# Patient Record
Sex: FEMALE | Race: WHITE | HISPANIC OR LATINO | Employment: FULL TIME | URBAN - METROPOLITAN AREA
[De-identification: names, ages, dates, MRNs, and addresses within clinical notes are randomized per-mention and may not be internally consistent; named-entity substitution may affect disease eponyms.]

---

## 2017-02-21 ENCOUNTER — TRANSFERRED RECORDS (OUTPATIENT)
Dept: HEALTH INFORMATION MANAGEMENT | Facility: CLINIC | Age: 37
End: 2017-02-21

## 2017-02-24 ENCOUNTER — OFFICE VISIT (OUTPATIENT)
Dept: ORTHOPEDICS | Facility: CLINIC | Age: 37
End: 2017-02-24
Payer: COMMERCIAL

## 2017-02-24 VITALS
SYSTOLIC BLOOD PRESSURE: 112 MMHG | HEIGHT: 70 IN | WEIGHT: 203 LBS | DIASTOLIC BLOOD PRESSURE: 70 MMHG | BODY MASS INDEX: 29.06 KG/M2

## 2017-02-24 DIAGNOSIS — M25.562 ACUTE PAIN OF LEFT KNEE: Primary | ICD-10-CM

## 2017-02-24 PROCEDURE — 99213 OFFICE O/P EST LOW 20 MIN: CPT | Performed by: FAMILY MEDICINE

## 2017-02-24 NOTE — MR AVS SNAPSHOT
"              After Visit Summary   2017    Adore Roland    MRN: [id number]           Patient Information     Date Of Birth          1980        Visit Information        Provider Department      2017 8:00 AM Osorio Bain MD [facility]        Today's Diagnoses     Acute pain of left knee    -  1       Follow-ups after your visit        Follow-up notes from your care team     Return if symptoms worsen or fail to improve.      Who to contact     If you have questions or need follow up information about today's clinic visit or your schedule please contact [facility] directly at [phone].  Normal or non-critical lab and imaging results will be communicated to you by MyChart, letter or phone within 4 business days after the clinic has received the results. If you do not hear from us within 7 days, please contact the clinic through [employer] or phone. If you have a critical or abnormal lab result, we will notify you by phone as soon as possible.  Submit refill requests through [employer] or call your pharmacy and they will forward the refill request to us. Please allow 3 business days for your refill to be completed.          Additional Information About Your Visit        MyChart Information     [employer] lets you send messages to your doctor, view your test results, renew your prescriptions, schedule appointments and more. To sign up, go to [email or web address] . Click on \"Log in\" on the left side of the screen, which will take you to the Welcome page. Then click on \"Sign up Now\" on the right side of the page.     You will be asked to enter the access code listed below, as well as some personal information. Please follow the directions to create your username and password.     Your access code is: [email or web address]  Expires: 2017  9:21 AM     Your access code will  in 90 days. If you need help " "or a new code, please call your [place] clinic or [phone].        Care EveryWhere ID     This is your Care EveryWhere ID. This could be used by other organizations to access your [place] medical records  [id number]        Your Vitals Were     Height BMI (Body Mass Index)                5' 9.5\" (1.765 m) 29.55 kg/m2           Blood Pressure from Last 3 Encounters:   02/24/17 112/70   01/14/16 108/76   10/09/14 123/74    Weight from Last 3 Encounters:   02/24/17 203 lb (92.1 kg)   01/14/16 203 lb (92.1 kg)   10/09/14 185 lb (83.9 kg)              Today, you had the following     No orders found for display       Primary Care Provider    Md Other Clinic                Thank you!     Thank you for choosing [facility]  for your care. Our goal is always to provide you with excellent care. Hearing back from our patients is one way we can continue to improve our services. Please take a few minutes to complete the written survey that you may receive in the mail after your visit with us. Thank you!             Your Updated Medication List - Protect others around you: Learn how to safely use, store and throw away your medicines at www.disposemymeds.org.      Notice  As of 2/24/2017  9:21 AM    You have not been prescribed any medications.      "

## 2017-02-24 NOTE — NURSING NOTE
"Chief Complaint   Patient presents with     Musculoskeletal Problem     L medial knee pain       Initial /70  Ht 5' 9.5\" (1.765 m)  Wt 203 lb (92.1 kg)  BMI 29.55 kg/m2 Estimated body mass index is 29.55 kg/(m^2) as calculated from the following:    Height as of this encounter: 5' 9.5\" (1.765 m).    Weight as of this encounter: 203 lb (92.1 kg).  Medication Reconciliation: complete     Domenic Owen, ATC      "

## 2017-02-24 NOTE — PROGRESS NOTES
"[facility]   Clinic Visit s Feb 24, 2017    PCP: Other Clinic, Md      Adore is a 36 year old female who is seen as self referral for   Chief Complaint   Patient presents with     Musculoskeletal Problem     L medial knee pain     Injury: pain started after playing volleyball on 2/15/17, does recall having an episode of landing on her knee and forcing her knees into hyperflexion but didn't have immediate pain    Location of Pain: left medial knee pain  Duration of Pain: 9 day(s)  Rating of Pain at worst: 10/10  Rating of Pain Currently: 2/10  Pain is worse with: twisting on left knee, walking  Pain is better with: elevating  Treatment so far consists of: rest/activity avoidance, elevation, ice and knee brace  Associated symptoms: swelling  Prior History of related problems: none    Mild pain with twisting, pivoting pain.    No locking or other mechanical symptoms.    Social History: works at [employer]       Family History   Problem Relation Age of Onset     DIABETES Maternal Grandfather      DIABETES Paternal Aunt      Other - See Comments Maternal Grandfather      heart troubles       Social History     Social History     Marital status: Single     Spouse name: N/A     Number of children: N/A     Years of education: N/A     Social History Main Topics     Smoking status: Never Smoker     Smokeless tobacco: Never Used     Alcohol use 0.0 oz/week     0 Standard drinks or equivalent per week       PMH, PSHX, FMHX, SOCHX all reviewed and are unremarkable or noncontributory to today's visit.  See intake form for details.      Review of Systems   Musculoskeletal: Positive for joint pain.   All other systems reviewed and are negative.        Physical Exam   Musculoskeletal:        Left knee: Medial joint line tenderness noted.     /70  Ht 5' 9.5\" (1.765 m)  Wt 203 lb (92.1 kg)  BMI 29.55 kg/m2  Constitutional:well-developed, well-nourished, and in no distress.   Cardiovascular: Intact " distal pulses.    Neurological: alert. Gait Normal:   Gait, station, stance, and balance appear normal for age  Skin: Skin is warm and dry.   Psychiatric: Mood and affect normal.   Respiratory: unlabored, speaks in full sentences  Lymph: no LAD, no lymphangitis      Left Knee Exam   Swelling: Mild  Effusion: Yes    Tenderness   The patient is experiencing tenderness in the medial joint line.    Range of Motion   Extension: Normal  Flexion:     Normal    Tests   McMurrays:  Medial - Positive      Lateral - Negative  Lachman:  Anterior - Negative    Posterior - Negative  Drawer:       Anterior - Negative     Posterior - Negative  Varus:  Negative  Valgus: Negative  Pivot Shift: Negative  Patellar Apprehension: No    Comments:  Thessaly test mild positive medially     Right Knee Exam   Swelling: None  Effusion: No    Tenderness   None    Range of Motion   Extension: Normal  Flexion:     Normal    Tests   McMurrays:  Medial - Negative      Lateral - Negative  Lachman:  Anterior - Negative    Posterior - Negative  Drawer:       Anterior - Negative    Posterior - Negative  Varus:  Negative  Valgus: Negative  Pivot Shift: Negative  Patellar Apprehension: No      external images and MRI reviewed, agree with below, inconclusive for mensicus tear:  [facility] IMAGING  MRI LEFT KNEE  2/21/2017 8:51 AM    INDICATION: Knee Pain.  TECHNIQUE: Routine.  COMPARISON: None.    FINDINGS:  MEDIAL COMPARTMENT: The medial meniscus is intact. Smooth articular cartilage on both sides of the compartment without significant thinning or edema.    LATERAL COMPARTMENT: On series 6, image 15 and series 8, image 12, there appears to be meniscal tissue flipped centrally toward the intracondylar notch worrisome for bucket-handle tear. However, the donor site for this tear is not definitively identified as the apical free margin of the lateral meniscal body appears intact. Recommend correlation with any mechanical type symptoms or  lateral-sided knee pain. Potentially, this could represent a bucket-handle tear from a previous discoid variant meniscus. There is smooth articular cartilage on both sides of the compartment without thinning or edema.    PATELLOFEMORAL COMPARTMENT: Cartilaginous tearing along the lateral retropatellar facet on series 3, image 26. Additional mild cartilage edema medial retropatellar facet. Smooth articular cartilage along the opposing surfaces of the femoral trochlear sulcus.    LIGAMENTS AND TENDONS: The cruciate ligaments are negative. The collateral ligaments are negative. The popliteus tendon is negative. The posterolateral corner structures are intact.    BONES AND SOFT TISSUES: No evidence for fracture. No marrow signal abnormality. Tiny effusion. Soft tissues of the popliteal fossa are negative.    CONCLUSION:  1.  There is abnormal soft tissue along the lateral margin of the intracondylar notch which appears contiguous with the lateral meniscus and is worrisome for a small bucket-handle flap although the donor site for this is not identified. Potentially, there could have been a discoid lateral meniscus.  2.  Cartilaginous tearing along the lateral retropatellar facet with additional mild cartilage edema medial retropatellar facet.  3.  No evidence for marrow signal abnormality or occult fracture.  4.  Tiny effusion.      ASSESSMENT/PLAN    ICD-10-CM    1. Acute pain of left knee M25.562      Reviewed MRI and exam findings in detail with the patient. Noted mildly positive mensicus injury findings on exam, without loss of ROM or clearly identifiable mechanical symptoms suggesting bucket handle tear. She is functioning fine, is finding that wearing knee brace is more uncomfortable without it.    Given inconclusive MRI findings and minimal symptoms, a trial of period of watchful waiting is acceptable. She may go about her regular activities, any may try returning to volleyball but she should wear brace while  playing vball ,instructed to monitor for worsening pain or any mechanical symptoms. She may call to discuss should these symptoms develop or return for recheck in 1 week. She was comfortable with this plan.

## 2021-04-15 ENCOUNTER — APPOINTMENT (OUTPATIENT)
Dept: CT IMAGING | Facility: CLINIC | Age: 41
End: 2021-04-15
Attending: EMERGENCY MEDICINE
Payer: COMMERCIAL

## 2021-04-15 ENCOUNTER — HOSPITAL ENCOUNTER (EMERGENCY)
Facility: CLINIC | Age: 41
Discharge: HOME | End: 2021-04-15
Attending: EMERGENCY MEDICINE | Admitting: EMERGENCY MEDICINE
Payer: COMMERCIAL

## 2021-04-15 VITALS
RESPIRATION RATE: 16 BRPM | BODY MASS INDEX: 32.58 KG/M2 | HEART RATE: 92 BPM | HEIGHT: 69 IN | DIASTOLIC BLOOD PRESSURE: 85 MMHG | TEMPERATURE: 98.3 F | OXYGEN SATURATION: 97 % | SYSTOLIC BLOOD PRESSURE: 126 MMHG | WEIGHT: 220 LBS

## 2021-04-15 DIAGNOSIS — K92.1 BLACK STOOL: ICD-10-CM

## 2021-04-15 DIAGNOSIS — R10.9 ABDOMINAL PAIN OF UNKNOWN CAUSE: ICD-10-CM

## 2021-04-15 LAB
ALBUMIN UR-MCNC: NEGATIVE MG/DL
ANION GAP SERPL CALCULATED.3IONS-SCNC: 5 MMOL/L (ref 3–14)
APPEARANCE UR: CLEAR
B-HCG FREE SERPL-ACNC: <5 IU/L
BASOPHILS # BLD AUTO: 0.1 10E9/L (ref 0–0.2)
BASOPHILS NFR BLD AUTO: 0.7 %
BILIRUB UR QL STRIP: NEGATIVE
BUN SERPL-MCNC: 11 MG/DL (ref 7–30)
CALCIUM SERPL-MCNC: 9.1 MG/DL (ref 8.5–10.1)
CHLORIDE SERPL-SCNC: 108 MMOL/L (ref 94–109)
CO2 SERPL-SCNC: 24 MMOL/L (ref 20–32)
COLOR UR AUTO: ABNORMAL
CREAT SERPL-MCNC: 1.16 MG/DL (ref 0.52–1.04)
DIFFERENTIAL METHOD BLD: NORMAL
EOSINOPHIL # BLD AUTO: 0.1 10E9/L (ref 0–0.7)
EOSINOPHIL NFR BLD AUTO: 1.2 %
ERYTHROCYTE [DISTWIDTH] IN BLOOD BY AUTOMATED COUNT: 11.9 % (ref 10–15)
GFR SERPL CREATININE-BSD FRML MDRD: 59 ML/MIN/{1.73_M2}
GLUCOSE SERPL-MCNC: 105 MG/DL (ref 70–99)
GLUCOSE UR STRIP-MCNC: NEGATIVE MG/DL
HCT VFR BLD AUTO: 39.7 % (ref 35–47)
HEMOCCULT STL QL: NEGATIVE
HGB BLD-MCNC: 13.1 G/DL (ref 11.7–15.7)
HGB UR QL STRIP: NEGATIVE
IMM GRANULOCYTES # BLD: 0.1 10E9/L (ref 0–0.4)
IMM GRANULOCYTES NFR BLD: 0.5 %
INTERPRETATION ECG - MUSE: NORMAL
KETONES UR STRIP-MCNC: NEGATIVE MG/DL
LEUKOCYTE ESTERASE UR QL STRIP: NEGATIVE
LYMPHOCYTES # BLD AUTO: 1.9 10E9/L (ref 0.8–5.3)
LYMPHOCYTES NFR BLD AUTO: 19.1 %
MCH RBC QN AUTO: 29.6 PG (ref 26.5–33)
MCHC RBC AUTO-ENTMCNC: 33 G/DL (ref 31.5–36.5)
MCV RBC AUTO: 90 FL (ref 78–100)
MONOCYTES # BLD AUTO: 0.7 10E9/L (ref 0–1.3)
MONOCYTES NFR BLD AUTO: 6.7 %
MUCOUS THREADS #/AREA URNS LPF: PRESENT /LPF
NEUTROPHILS # BLD AUTO: 7 10E9/L (ref 1.6–8.3)
NEUTROPHILS NFR BLD AUTO: 71.8 %
NITRATE UR QL: NEGATIVE
NRBC # BLD AUTO: 0 10*3/UL
NRBC BLD AUTO-RTO: 0 /100
PH UR STRIP: 7 PH (ref 5–7)
PLATELET # BLD AUTO: 281 10E9/L (ref 150–450)
POTASSIUM SERPL-SCNC: 3.3 MMOL/L (ref 3.4–5.3)
RBC # BLD AUTO: 4.43 10E12/L (ref 3.8–5.2)
RBC #/AREA URNS AUTO: <1 /HPF (ref 0–2)
SODIUM SERPL-SCNC: 137 MMOL/L (ref 133–144)
SOURCE: ABNORMAL
SP GR UR STRIP: 1.01 (ref 1–1.03)
SQUAMOUS #/AREA URNS AUTO: 11 /HPF (ref 0–1)
UROBILINOGEN UR STRIP-MCNC: NORMAL MG/DL (ref 0–2)
WBC # BLD AUTO: 9.7 10E9/L (ref 4–11)
WBC #/AREA URNS AUTO: 5 /HPF (ref 0–5)

## 2021-04-15 PROCEDURE — 96360 HYDRATION IV INFUSION INIT: CPT | Mod: 59

## 2021-04-15 PROCEDURE — 258N000003 HC RX IP 258 OP 636: Performed by: EMERGENCY MEDICINE

## 2021-04-15 PROCEDURE — 74177 CT ABD & PELVIS W/CONTRAST: CPT

## 2021-04-15 PROCEDURE — 93005 ELECTROCARDIOGRAM TRACING: CPT

## 2021-04-15 PROCEDURE — 81001 URINALYSIS AUTO W/SCOPE: CPT | Performed by: EMERGENCY MEDICINE

## 2021-04-15 PROCEDURE — 82272 OCCULT BLD FECES 1-3 TESTS: CPT | Performed by: EMERGENCY MEDICINE

## 2021-04-15 PROCEDURE — 80048 BASIC METABOLIC PNL TOTAL CA: CPT | Performed by: EMERGENCY MEDICINE

## 2021-04-15 PROCEDURE — 250N000011 HC RX IP 250 OP 636: Performed by: EMERGENCY MEDICINE

## 2021-04-15 PROCEDURE — 96361 HYDRATE IV INFUSION ADD-ON: CPT

## 2021-04-15 PROCEDURE — 250N000009 HC RX 250: Performed by: EMERGENCY MEDICINE

## 2021-04-15 PROCEDURE — 99285 EMERGENCY DEPT VISIT HI MDM: CPT | Mod: 25

## 2021-04-15 PROCEDURE — 85025 COMPLETE CBC W/AUTO DIFF WBC: CPT | Performed by: EMERGENCY MEDICINE

## 2021-04-15 PROCEDURE — 84702 CHORIONIC GONADOTROPIN TEST: CPT

## 2021-04-15 RX ORDER — IOPAMIDOL 755 MG/ML
500 INJECTION, SOLUTION INTRAVASCULAR ONCE
Status: COMPLETED | OUTPATIENT
Start: 2021-04-15 | End: 2021-04-15

## 2021-04-15 RX ADMIN — SODIUM CHLORIDE 65 ML: 9 INJECTION, SOLUTION INTRAVENOUS at 08:30

## 2021-04-15 RX ADMIN — SODIUM CHLORIDE 1000 ML: 9 INJECTION, SOLUTION INTRAVENOUS at 08:14

## 2021-04-15 RX ADMIN — IOPAMIDOL 100 ML: 755 INJECTION, SOLUTION INTRAVENOUS at 08:30

## 2021-04-15 ASSESSMENT — ENCOUNTER SYMPTOMS
NAUSEA: 1
CHILLS: 0
VOMITING: 0
BLOOD IN STOOL: 1
DIARRHEA: 1
DIZZINESS: 0
FEVER: 0
ABDOMINAL PAIN: 1
SHORTNESS OF BREATH: 0

## 2021-04-15 ASSESSMENT — MIFFLIN-ST. JEOR: SCORE: 1742.29

## 2021-04-15 NOTE — ED TRIAGE NOTES
Pt had a colonoscopy at the end of last month. Presents today after one episode of black diarrhea. States that she took pepto 2 days ago. Denies meds today. A+Ox4, no acute signs of distress.

## 2021-04-15 NOTE — ED PROVIDER NOTES
History   Chief Complaint:  Abdominal Pain       The history is provided by the patient.      Adore Roland is a 40 year old female who presents with black diarrhea and associated abdominal pain. The patient reports that she recently underwent a colonoscopy about 2 weeks ago on 03/31 for symptoms of chronic LUQ abdominal pain and bright red blood in stool. The procedure identified two internal hemorrhoids, and also removed one large polyp. The patient reports that her abdominal pain became more frequent and intense after the colonoscopy and this morning was concerning to her due to the black diarrhea. She had a bowel movement at approximately 6:45 a.m. this morning that was loose and consisted of black stool. Her last normal bowel movement was on Tues 04/13 and was normal, and also notes that she took Pepto Bismol on this day. Here, the patient endorses some ongoing but dull abdominal pain. She has felt nauseated but denies any vomiting. She further denies any chest pain, shortness of breath, dizziness, fevers, or chills. No chance of pregnancy. Of note, the patient was reportedly treated for UTI Tuesday 04/13 at [facility] urgent care in [place], and was started on antibiotics. At that time she had symptoms of cloudy urine, frequency, and abdominal cramping, but no dysuria. She was given antibiotics which she had started, but has not yet taken her dose today.    03/31/2021 Colonoscopy Findings:  The perianal and digital rectal examinations were normal. A 15 mm polyp was found in the sigmoid colon. The polyp was pedunculated. The polyp was removed with a hot snare. Resection and retrieval were complete. To prevent bleeding after the polypectomy, one hemostatic clip was successfully placed (MR conditional). There was no bleeding at the end of the procedure. Non-bleeding internal hemorrhoids were found during retroflexion. The hemorrhoids were mild. The terminal ileum appeared normal.    04/13/2021  "Urinalysis:  Hazy clarity (A), Specific gravity 1.030 (A), Small bilirubin (A), Trace leukocyte esterase (A), Moderate bacteria (A), Moderate squamous epithelial cells (A), Mucous present (A), o/w WNL      Review of Systems   Constitutional: Negative for chills and fever.   Respiratory: Negative for shortness of breath.    Cardiovascular: Negative for chest pain.   Gastrointestinal: Positive for abdominal pain, blood in stool (black), diarrhea and nausea. Negative for vomiting.   Neurological: Negative for dizziness.   All other systems reviewed and are negative.    Allergies:  No Known Drug Allergies    Medications:  Levothyroxine  Bactrim    Past Medical History:    GERD  Thyroid nodule    Past Surgical History:    Colonoscopy with polypectomy  Bartholin gland cyst excision  Therapeutic     Family History:    Father - mitral valve prolapse  Mother - hypothyroidism    Social History:  The patient was not accompanied to the ED.  Marital status:     Physical Exam     Patient Vitals for the past 24 hrs:   BP Temp Temp src Pulse Resp SpO2 Height Weight   04/15/21 1045 126/85 -- -- 92 -- 97 % -- --   04/15/21 1030 131/84 -- -- 101 -- 97 % -- --   04/15/21 1015 120/87 -- -- 88 -- 97 % -- --   04/15/21 1000 132/77 -- -- 90 -- 98 % -- --   04/15/21 0945 136/84 -- -- 87 -- 97 % -- --   04/15/21 0930 (!) 130/90 -- -- 99 -- 97 % -- --   04/15/21 0915 (!) 148/99 -- -- 96 -- 97 % -- --   04/15/21 0900 (!) 141/94 -- -- 101 -- 97 % -- --   04/15/21 0856 -- -- -- -- -- -- 1.753 m (5' 9\") 99.8 kg (220 lb)   04/15/21 0845 (!) 133/90 -- -- 104 -- -- -- --   04/15/21 0815 121/86 -- -- 88 -- 96 % -- --   04/15/21 0738 133/85 98.3  F (36.8  C) Temporal 112 16 98 % -- --       Physical Exam  General: Adult female sitting upright  Eyes: PERRL, Conjunctive within normal limits. No scleral icterus.  ENT: Moist mucous membranes, oropharynx clear.   CV: Normal S1S2, no murmur, rub or gallop. Regular rate and rhythm  Resp: Clear " to auscultation bilaterally, no wheezes, rales or rhonchi. Normal respiratory effort.  GI: Abdomen is soft and nondistended. RLQ tenderness to palpation. Mild tenderness in the LUQ. No palpable masses. No rebound or guarding.  Rectal: Normal tone. Small amount of greenish stool in the rectal vault. No visible melena or blood. Nontender. No palpable masses.   MSK: No edema. Nontender. Normal active range of motion.  Skin: Warm and dry. No rashes or lesions or ecchymoses on visible skin.  Neuro: Alert and oriented. Responds appropriately to all questions and commands. No focal findings appreciated. Normal muscle tone.  Psych: Normal mood and affect. Pleasant.    Emergency Department Course     ECG:  Completed at 0746.  Read at 0746.   Sinus tachycardia   Rate 111 bpm. GA interval 188. QRS duration 88. QT/QTc 342/465. P-R-T axes 56 60 26.  Agree with computer interpretation.     Imaging:  CT Abdomen Pelvis w Contrast:  1.  No acute findings in the abdomen and pelvis.  2.  Linear metallic density in the sigmoid colon appears to be an ingested metallic foreign object. No surrounding inflammatory changes. No perforation.  Report per radiology.    Laboratory:  CBC: WBC 9.7, HGB 13.1,   BMP: Potassium 3.3 (L), Glucose 105 (H), Creatinine 1.16 (H), GFR estimate 59 (L), o/w WNL     ISTAT HCG quantitative pregnancy: <5.0    UA with microscopic: Squamous epithelial/HPF 11 (H), Mucous present (A), o/w WNL     Occult blood stool: negative    Emergency Department Course:    Reviewed:  I reviewed the patient's nursing notes, vitals, past medical history and care everywhere.     Assessments:  0743 I performed an exam of the patient in room 08 as documented above.  0921 Patient rechecked and updated. She is feeling anxious.  1020 I rechecked and updated the patient.    Interventions:  0814 NS 1L IV Bolus    Disposition:  The patient was discharged to home.     Impression & Plan     Medical Decision Making:  Adore Roland is  a 40 year old female who presents with abdominal pain and black appearing stool.  This was in setting of use of Pepto-Bismol yesterday, so may be false.  Negative stool occult is supportive of this.  She looks overall well and without a concerning etiology of abdominal pain.  Abdominal pain has been somewhat chronic in nature, but worse recently and she has never had any imaging, therefore a broad differential diagnosis was of course considered.  Localization of pain to the right lower quadrant may indicate appendicitis.  Complication due to recent colonoscopy seems less likely given duration of time that has elapsed since the procedure.  The workup in the ED is at this point negative.  No etiology for the patients pain is found at this point and my suspicion of an intraabdominal catastrophe or other worrisome etiology is very low. CT and lab work are reassuring.  The metallic density noted is likely the surgical clip after polypectomy.  And her overall well appearance and no signs of active bleeding or likely bleeding at all, I will not therefore admit her for serial exams and further workup.  Patient is hemodynamically stable in ED. Plan is home with abdominal pain recheck by primary care physician within 2 to 3 days or return to ED at anytime.  Return for fevers greater than 102, increasing pain, other new symptoms develop.  Abdominal pain handout given.  Questions were answered.     Diagnosis:    ICD-10-CM    1. Abdominal pain of unknown cause  R10.9    2. Black stool  K92.1        Discharge Medications:   None.      Scribe Disclosure:  Vera NEVES, am serving as a scribe at 7:43 AM on 4/15/2021 to document services personally performed by Salma Bales MD based on my observations and the provider's statements to me.     This note was completed in part using Dragon voice recognition software. Although reviewed after completion, some word and grammatical errors may occur.     Vera  Varinder  4/15/2021   [facility] [place]         Salma Bales MD  04/15/21 [id number]

## 2021-09-12 ENCOUNTER — HOSPITAL ENCOUNTER (EMERGENCY)
Facility: CLINIC | Age: 41
Discharge: HOME | End: 2021-09-12
Attending: EMERGENCY MEDICINE | Admitting: EMERGENCY MEDICINE
Payer: COMMERCIAL

## 2021-09-12 ENCOUNTER — APPOINTMENT (OUTPATIENT)
Dept: CT IMAGING | Facility: CLINIC | Age: 41
End: 2021-09-12
Attending: EMERGENCY MEDICINE
Payer: COMMERCIAL

## 2021-09-12 VITALS
RESPIRATION RATE: 16 BRPM | TEMPERATURE: 97.7 F | BODY MASS INDEX: 31.01 KG/M2 | OXYGEN SATURATION: 100 % | SYSTOLIC BLOOD PRESSURE: 117 MMHG | DIASTOLIC BLOOD PRESSURE: 79 MMHG | HEART RATE: 89 BPM | WEIGHT: 210 LBS

## 2021-09-12 DIAGNOSIS — R51.9 NONINTRACTABLE HEADACHE, UNSPECIFIED CHRONICITY PATTERN, UNSPECIFIED HEADACHE TYPE: ICD-10-CM

## 2021-09-12 LAB
HOLD SPECIMEN: NORMAL

## 2021-09-12 PROCEDURE — 96374 THER/PROPH/DIAG INJ IV PUSH: CPT

## 2021-09-12 PROCEDURE — 258N000003 HC RX IP 258 OP 636: Performed by: EMERGENCY MEDICINE

## 2021-09-12 PROCEDURE — 250N000011 HC RX IP 250 OP 636: Performed by: EMERGENCY MEDICINE

## 2021-09-12 PROCEDURE — 99284 EMERGENCY DEPT VISIT MOD MDM: CPT | Mod: 25

## 2021-09-12 PROCEDURE — 96375 TX/PRO/DX INJ NEW DRUG ADDON: CPT

## 2021-09-12 PROCEDURE — 70450 CT HEAD/BRAIN W/O DYE: CPT

## 2021-09-12 PROCEDURE — 96361 HYDRATE IV INFUSION ADD-ON: CPT

## 2021-09-12 RX ORDER — KETOROLAC TROMETHAMINE 15 MG/ML
15 INJECTION, SOLUTION INTRAMUSCULAR; INTRAVENOUS ONCE
Status: COMPLETED | OUTPATIENT
Start: 2021-09-12 | End: 2021-09-12

## 2021-09-12 RX ORDER — DIPHENHYDRAMINE HYDROCHLORIDE 50 MG/ML
25 INJECTION INTRAMUSCULAR; INTRAVENOUS ONCE
Status: COMPLETED | OUTPATIENT
Start: 2021-09-12 | End: 2021-09-12

## 2021-09-12 RX ORDER — METOCLOPRAMIDE HYDROCHLORIDE 5 MG/ML
10 INJECTION INTRAMUSCULAR; INTRAVENOUS ONCE
Status: COMPLETED | OUTPATIENT
Start: 2021-09-12 | End: 2021-09-12

## 2021-09-12 RX ADMIN — SODIUM CHLORIDE 1000 ML: 9 INJECTION, SOLUTION INTRAVENOUS at 14:01

## 2021-09-12 RX ADMIN — DIPHENHYDRAMINE HYDROCHLORIDE 25 MG: 50 INJECTION INTRAMUSCULAR; INTRAVENOUS at 14:01

## 2021-09-12 RX ADMIN — KETOROLAC TROMETHAMINE 15 MG: 15 INJECTION, SOLUTION INTRAMUSCULAR; INTRAVENOUS at 14:02

## 2021-09-12 RX ADMIN — METOCLOPRAMIDE HYDROCHLORIDE 10 MG: 5 INJECTION INTRAMUSCULAR; INTRAVENOUS at 14:01

## 2021-09-12 ASSESSMENT — ENCOUNTER SYMPTOMS
NECK PAIN: 1
DYSURIA: 0
NUMBNESS: 1
DIARRHEA: 0
HEADACHES: 1

## 2021-09-12 NOTE — ED PROVIDER NOTES
History   Chief Complaint:  Headache       The history is provided by the patient.      Adore Roland is a 40 year old female otherwise healthy who presents with a headache. The patient has been experiencing sharp, intermittent pain in her head and neck for the past week and a half. She also has noticed increased ringing in both ears, sensitivity on the back of her head, and some tingling in her right hand and right foot. She has not had any vision changes, diarrhea, or dysuria. Tylenol has not relieved her symptoms. She did not fall or sustain any trauma to her head before the onset of her symptoms. She is vaccinated against COVID.      Review of Systems   Constitutional: Negative for chills and fever.   Eyes: Negative for visual disturbance.   Respiratory: Negative for cough.    Gastrointestinal: Negative for diarrhea, nausea and vomiting.   Genitourinary: Negative for dysuria.   Musculoskeletal: Positive for neck pain.   Neurological: Positive for numbness (tingling) and headaches. Negative for weakness.   Psychiatric/Behavioral: Negative for confusion.   All other systems reviewed and are negative.      Allergies:  The patient has no known allergies.     Medications:  The patient is not currently taking any prescribed medications.    Past Medical History:    The patient denies any significant past medical history.     Social History:  Patient presents alone    Physical Exam     Patient Vitals for the past 24 hrs:   BP Temp Temp src Pulse Resp SpO2 Weight   09/12/21 1524 -- -- -- -- -- 100 % --   09/12/21 1523 -- -- -- -- -- 100 % --   09/12/21 1521 -- -- -- -- -- 100 % --   09/12/21 1444 -- -- -- -- -- 100 % --   09/12/21 1443 -- -- -- -- -- 100 % --   09/12/21 1442 -- -- -- -- -- 100 % --   09/12/21 1433 -- -- -- -- -- 100 % --   09/12/21 1432 -- -- -- -- -- 100 % --   09/12/21 1431 -- -- -- -- -- 100 % --   09/12/21 1407 -- -- -- -- -- 100 % --   09/12/21 1406 117/79 -- -- 89 -- 100 % --   09/12/21 1336 --  -- -- -- -- 99 % --   09/12/21 1334 -- -- -- -- -- 99 % --   09/12/21 1333 -- -- -- -- -- 100 % --   09/12/21 1225 126/88 97.7  F (36.5  C) Temporal 112 16 100 % 95.3 kg (210 lb)       Physical Exam  Constitutional: Well appearing.  HEENT: Atraumatic.  PERRL.  EOMI. TMs normal bilaterally.  Moist mucous membranes.  Neck: Soft.  Supple.    Cardiac: Regular rate and rhythm.  No murmur or rub.  Respiratory: Clear to auscultation bilaterally.  No respiratory distress.  No wheezing, rhonchi, or rales.  Abdomen: Soft and nontender. Nondistended.  Musculoskeletal: No edema.  Normal range of motion.  Neurologic: Alert and oriented x3.  Normal tone and bulk.  No facial drooping.  Normal speech.  Normal finger-to-nose.  5/5 strength in bilateral upper and lower extremities.  Sensation to light touch intact throughout.  Normal gait.  Skin: No rashes.  No edema.  Psych: Normal affect.  Normal behavior.      Emergency Department Course     Imaging:  CT Head w/o Contrast  No evidence of acute intracranial hemorrhage, mass, or  herniation.  As per radiology    Emergency Department Course:    Reviewed:  I reviewed nursing notes, vitals and past medical history    Assessments:  1333 I obtained history and examined the patient as noted above.     Interventions:  1401 Reglan 10 mg IV  1401 Benadryl 25 mg IV  1402 Toradol 15 mg IV    Disposition:  The patient was discharged to home.       Impression & Plan     Medical Decision Making:  Adore Roland is a 32-year-old woman who is afebrile and hemodynamically stable.  She is neurologically intact with no focal deficits.  She has no fever or meningismus and my concern for meningitis/encephalitis is exceedingly low.  We discussed the risks and benefits of a CT scan of the head and she would prefer to obtain a CT scan of the head at this time.  Thankfully, this revealed no acute intracranial abnormalities.  She had some improvement with the above intervention.  Etiology of her headaches  include benign etiologies including tension headache versus migraine versus other.  I discussed the results and plan for discharge home with close primary care follow-up and potential referral for physical therapy or headache physician and she is in agreement understanding.  I see no emergent or life-threatening condition today that require hospitalization or further emergent testing or treatment.  Her questions were answered and she was in no distress at time of discharge.    Diagnosis:    ICD-10-CM    1. Nonintractable headache, unspecified chronicity pattern, unspecified headache type  R51.9        Discharge Medications:  New Prescriptions    No medications on file       Scribe Disclosure:  I, Maryjane Betancourt, am serving as a scribe at 12:50 PM on 9/12/2021 to document services personally performed by Max Cordova MD based on my observations and the provider's statements to me.              Max Cordova MD  09/13/21 0952

## 2021-09-12 NOTE — ED TRIAGE NOTES
Pt here with c/o intermittent, generalized HA and neck pain x1 week. No visual changes, dizziness, n/v. No OTC meds today. ABC intact. A&Ox4.

## 2021-09-13 ASSESSMENT — ENCOUNTER SYMPTOMS
CONFUSION: 0
FEVER: 0
NAUSEA: 0
WEAKNESS: 0
VOMITING: 0
COUGH: 0
CHILLS: 0

## 2021-11-18 ENCOUNTER — HOSPITAL ENCOUNTER (EMERGENCY)
Facility: CLINIC | Age: 41
Discharge: HOME | End: 2021-11-18
Attending: EMERGENCY MEDICINE | Admitting: EMERGENCY MEDICINE
Payer: COMMERCIAL

## 2021-11-18 VITALS
OXYGEN SATURATION: 97 % | HEART RATE: 89 BPM | TEMPERATURE: 97.2 F | RESPIRATION RATE: 16 BRPM | SYSTOLIC BLOOD PRESSURE: 125 MMHG | DIASTOLIC BLOOD PRESSURE: 78 MMHG

## 2021-11-18 DIAGNOSIS — I49.3 SYMPTOMATIC PVCS: ICD-10-CM

## 2021-11-18 DIAGNOSIS — R00.2 PALPITATIONS: ICD-10-CM

## 2021-11-18 LAB
ANION GAP SERPL CALCULATED.3IONS-SCNC: 7 MMOL/L (ref 3–14)
ATRIAL RATE - MUSE: 92 BPM
BASOPHILS # BLD AUTO: 0.1 10E3/UL (ref 0–0.2)
BASOPHILS NFR BLD AUTO: 1 %
BUN SERPL-MCNC: 14 MG/DL (ref 7–30)
CALCIUM SERPL-MCNC: 8.8 MG/DL (ref 8.5–10.1)
CHLORIDE BLD-SCNC: 107 MMOL/L (ref 94–109)
CO2 SERPL-SCNC: 25 MMOL/L (ref 20–32)
CREAT SERPL-MCNC: 0.92 MG/DL (ref 0.52–1.04)
D DIMER PPP FEU-MCNC: 0.41 UG/ML FEU (ref 0–0.5)
DIASTOLIC BLOOD PRESSURE - MUSE: NORMAL MMHG
EOSINOPHIL # BLD AUTO: 0.2 10E3/UL (ref 0–0.7)
EOSINOPHIL NFR BLD AUTO: 2 %
ERYTHROCYTE [DISTWIDTH] IN BLOOD BY AUTOMATED COUNT: 12.3 % (ref 10–15)
GFR SERPL CREATININE-BSD FRML MDRD: 79 ML/MIN/1.73M2
GLUCOSE BLD-MCNC: 124 MG/DL (ref 70–99)
HCT VFR BLD AUTO: 38.2 % (ref 35–47)
HGB BLD-MCNC: 12.5 G/DL (ref 11.7–15.7)
HOLD SPECIMEN: NORMAL
IMM GRANULOCYTES # BLD: 0 10E3/UL
IMM GRANULOCYTES NFR BLD: 0 %
INTERPRETATION ECG - MUSE: NORMAL
LYMPHOCYTES # BLD AUTO: 2.6 10E3/UL (ref 0.8–5.3)
LYMPHOCYTES NFR BLD AUTO: 31 %
MCH RBC QN AUTO: 29.7 PG (ref 26.5–33)
MCHC RBC AUTO-ENTMCNC: 32.7 G/DL (ref 31.5–36.5)
MCV RBC AUTO: 91 FL (ref 78–100)
MONOCYTES # BLD AUTO: 0.8 10E3/UL (ref 0–1.3)
MONOCYTES NFR BLD AUTO: 10 %
NEUTROPHILS # BLD AUTO: 4.7 10E3/UL (ref 1.6–8.3)
NEUTROPHILS NFR BLD AUTO: 56 %
NRBC # BLD AUTO: 0 10E3/UL
NRBC BLD AUTO-RTO: 0 /100
P AXIS - MUSE: 62 DEGREES
PLATELET # BLD AUTO: 302 10E3/UL (ref 150–450)
POTASSIUM BLD-SCNC: 3.6 MMOL/L (ref 3.4–5.3)
PR INTERVAL - MUSE: 180 MS
QRS DURATION - MUSE: 94 MS
QT - MUSE: 366 MS
QTC - MUSE: 452 MS
R AXIS - MUSE: 54 DEGREES
RBC # BLD AUTO: 4.21 10E6/UL (ref 3.8–5.2)
SODIUM SERPL-SCNC: 139 MMOL/L (ref 133–144)
SYSTOLIC BLOOD PRESSURE - MUSE: NORMAL MMHG
T AXIS - MUSE: 45 DEGREES
TROPONIN I SERPL-MCNC: <0.015 UG/L (ref 0–0.04)
VENTRICULAR RATE- MUSE: 92 BPM
WBC # BLD AUTO: 8.3 10E3/UL (ref 4–11)

## 2021-11-18 PROCEDURE — 84484 ASSAY OF TROPONIN QUANT: CPT | Performed by: EMERGENCY MEDICINE

## 2021-11-18 PROCEDURE — 93005 ELECTROCARDIOGRAM TRACING: CPT

## 2021-11-18 PROCEDURE — 80048 BASIC METABOLIC PNL TOTAL CA: CPT | Performed by: EMERGENCY MEDICINE

## 2021-11-18 PROCEDURE — 85025 COMPLETE CBC W/AUTO DIFF WBC: CPT | Performed by: EMERGENCY MEDICINE

## 2021-11-18 PROCEDURE — 85379 FIBRIN DEGRADATION QUANT: CPT | Performed by: EMERGENCY MEDICINE

## 2021-11-18 PROCEDURE — 99284 EMERGENCY DEPT VISIT MOD MDM: CPT

## 2021-11-18 PROCEDURE — 36415 COLL VENOUS BLD VENIPUNCTURE: CPT | Performed by: EMERGENCY MEDICINE

## 2021-11-18 ASSESSMENT — ENCOUNTER SYMPTOMS
PALPITATIONS: 1
VOMITING: 0
DIARRHEA: 0
COUGH: 0
NAUSEA: 1

## 2021-11-18 NOTE — ED TRIAGE NOTES
Pt aox4, ABCs intact. Pt c/o 3 days of SOB, worsening yesterday. Pt states that her HR was 90s which is high for her. Pt also having chest pressure and pains when she takes a deep breath. Pt states that a couple of weeks ago she was having right calf pain that has slightly improved.

## 2021-11-18 NOTE — ED PROVIDER NOTES
History     Chief Complaint:  Palpitations     The history is provided by the patient.      Adore Roland is a 41 year old female with history of GERD who presents with chest pain and palpitations. Patient says that for the past few days she has had palpitations and tachycardia with rapid breathing and intermittent chest pressure. She states tachycardia for her is a HR in the 90s.  She describes her palpitations as lasting seconds.  An hour before coming to the ED, she says she experienced some nausea, but no vomiting. Denies diarrhea or cough.    Review of Systems   Respiratory: Negative for cough.    Cardiovascular: Positive for chest pain and palpitations.   Gastrointestinal: Positive for nausea. Negative for diarrhea and vomiting.   All other systems reviewed and are negative.    Allergies:  No Known Allergies    Medications:  Levothyroxine  Prisolec  Pepcid    Past Medical History:     GERD  Hashimoto's thyroiditis     Past Surgical History:    Lipoma excision    Family History:    Diabetes  Heart problems    Social History:  Patient accompanied by   PCP: No Ref-Primary, Physician    Physical Exam     Patient Vitals for the past 24 hrs:   BP Temp Temp src Pulse Resp SpO2   11/18/21 0510 125/78 -- -- 89 16 97 %   11/18/21 0325 131/85 97.2  F (36.2  C) Temporal 101 24 99 %     Physical Exam  Nursing note and vitals reviewed.  Constitutional: Cooperative. Resting comfortably in a chair.   HENT:   Mouth/Throat: Mucous membranes are normal.   Eyes: Pupils are equal, round, and reactive to light.   Cardiovascular: Normal rate, regular rhythm and normal heart sounds.  No murmur.  Pulmonary/Chest: Effort normal and breath sounds normal. No respiratory distress. No wheezes. No rales.   Abdominal: Soft. Normal appearance. There is no tenderness.  Neurological: Alert. Oriented x4  Skin: Skin is warm and dry.   Psychiatric: Normal mood and affect.     Emergency Department Course     ECG  ECG obtained at 0343,  ECG read at 0343  Normal sinus rhythm with sinus arrhythmia. Normal ECG.   No previous ECG compared.  Rate 92 bpm. FL interval 180 ms. QRS duration 94 ms. QT/QTc 366/452 ms. P-R-T axes 62 54 45.     Laboratory:  Labs Ordered and Resulted from Time of ED Arrival to Time of ED Departure   BASIC METABOLIC PANEL - Abnormal       Result Value    Sodium 139      Potassium 3.6      Chloride 107      Carbon Dioxide (CO2) 25      Anion Gap 7      Urea Nitrogen 14      Creatinine 0.92      Calcium 8.8      Glucose 124 (*)     GFR Estimate 79     TROPONIN I - Normal    Troponin I <0.015     D DIMER QUANTITATIVE - Normal    D-Dimer Quantitative 0.41     CBC WITH PLATELETS AND DIFFERENTIAL    WBC Count 8.3      RBC Count 4.21      Hemoglobin 12.5      Hematocrit 38.2      MCV 91      MCH 29.7      MCHC 32.7      RDW 12.3      Platelet Count 302      % Neutrophils 56      % Lymphocytes 31      % Monocytes 10      % Eosinophils 2      % Basophils 1      % Immature Granulocytes 0      NRBCs per 100 WBC 0      Absolute Neutrophils 4.7      Absolute Lymphocytes 2.6      Absolute Monocytes 0.8      Absolute Eosinophils 0.2      Absolute Basophils 0.1      Absolute Immature Granulocytes 0.0      Absolute NRBCs 0.0        Reviewed:  I reviewed nursing notes, vitals, past medical history and Care Everywhere    Assessments/Consults:  ED Course as of 11/18/21 0516   Thu Nov 18, 2021   0508 Obtained history and examined the patient as noted above     Disposition:  The patient was discharged to home.     Impression & Plan     Medical Decision Making:  Adore Roland is a 41 year old female who presents for evaluation of palpitations.  Initial ECG shows sinus rhythm.  A broad differential diagnosis was considered including SVT, Atrial fibrillation, ventricular arrhythmia, thyroid disease, acute electrolyte abnormality,  drugs/medications, caffeine intake or other stimulants, medication side effect, anemia, heart disease, PE, etc.  The workup  and exam here in ED would indicate that supportive outpatient management is indicated. I suspect symptomatic PVC's based on her description of symptoms.  I doubt PE as patient has a negative dimer and is not hypoxic.  Doubt acute coronary syndrome given symptoms and exam.      Diagnosis:    ICD-10-CM    1. Palpitations  R00.2    2. Probable Symptomatic PVCs  I49.3        Scribe Disclosure:  I, Roger Mcguire, am serving as a scribe at 5:07 AM on 11/18/2021 to document services personally performed by Nate Altamirano MD based on my observations and the provider's statements to me.        Nate Altamirano MD  11/18/21 5217

## 2022-01-23 ENCOUNTER — HEALTH MAINTENANCE LETTER (OUTPATIENT)
Age: 42
End: 2022-01-23

## 2022-04-09 ENCOUNTER — HOSPITAL ENCOUNTER (EMERGENCY)
Facility: CLINIC | Age: 42
Discharge: HOME | End: 2022-04-10
Attending: EMERGENCY MEDICINE | Admitting: EMERGENCY MEDICINE
Payer: COMMERCIAL

## 2022-04-09 ENCOUNTER — APPOINTMENT (OUTPATIENT)
Dept: GENERAL RADIOLOGY | Facility: CLINIC | Age: 42
End: 2022-04-09
Attending: EMERGENCY MEDICINE
Payer: COMMERCIAL

## 2022-04-09 DIAGNOSIS — R07.9 CHEST PAIN, UNSPECIFIED TYPE: ICD-10-CM

## 2022-04-09 LAB
ANION GAP SERPL CALCULATED.3IONS-SCNC: 3 MMOL/L (ref 3–14)
BASOPHILS # BLD AUTO: 0.1 10E3/UL (ref 0–0.2)
BASOPHILS NFR BLD AUTO: 1 %
BUN SERPL-MCNC: 10 MG/DL (ref 7–30)
CALCIUM SERPL-MCNC: 8.8 MG/DL (ref 8.5–10.1)
CHLORIDE BLD-SCNC: 107 MMOL/L (ref 94–109)
CO2 SERPL-SCNC: 29 MMOL/L (ref 20–32)
CREAT SERPL-MCNC: 0.78 MG/DL (ref 0.52–1.04)
EOSINOPHIL # BLD AUTO: 0.2 10E3/UL (ref 0–0.7)
EOSINOPHIL NFR BLD AUTO: 2 %
ERYTHROCYTE [DISTWIDTH] IN BLOOD BY AUTOMATED COUNT: 13.1 % (ref 10–15)
GFR SERPL CREATININE-BSD FRML MDRD: >90 ML/MIN/1.73M2
GLUCOSE BLD-MCNC: 96 MG/DL (ref 70–99)
HCT VFR BLD AUTO: 36.3 % (ref 35–47)
HGB BLD-MCNC: 11.6 G/DL (ref 11.7–15.7)
HOLD SPECIMEN: NORMAL
IMM GRANULOCYTES # BLD: 0 10E3/UL
IMM GRANULOCYTES NFR BLD: 0 %
LYMPHOCYTES # BLD AUTO: 3.1 10E3/UL (ref 0.8–5.3)
LYMPHOCYTES NFR BLD AUTO: 34 %
MCH RBC QN AUTO: 28 PG (ref 26.5–33)
MCHC RBC AUTO-ENTMCNC: 32 G/DL (ref 31.5–36.5)
MCV RBC AUTO: 88 FL (ref 78–100)
MONOCYTES # BLD AUTO: 0.7 10E3/UL (ref 0–1.3)
MONOCYTES NFR BLD AUTO: 8 %
NEUTROPHILS # BLD AUTO: 5.1 10E3/UL (ref 1.6–8.3)
NEUTROPHILS NFR BLD AUTO: 55 %
NRBC # BLD AUTO: 0 10E3/UL
NRBC BLD AUTO-RTO: 0 /100
PLATELET # BLD AUTO: 297 10E3/UL (ref 150–450)
POTASSIUM BLD-SCNC: 3.6 MMOL/L (ref 3.4–5.3)
RBC # BLD AUTO: 4.15 10E6/UL (ref 3.8–5.2)
SODIUM SERPL-SCNC: 139 MMOL/L (ref 133–144)
TROPONIN I SERPL HS-MCNC: <3 NG/L
WBC # BLD AUTO: 9.3 10E3/UL (ref 4–11)

## 2022-04-09 PROCEDURE — 36415 COLL VENOUS BLD VENIPUNCTURE: CPT | Performed by: EMERGENCY MEDICINE

## 2022-04-09 PROCEDURE — 71046 X-RAY EXAM CHEST 2 VIEWS: CPT

## 2022-04-09 PROCEDURE — 84484 ASSAY OF TROPONIN QUANT: CPT | Performed by: EMERGENCY MEDICINE

## 2022-04-09 PROCEDURE — 99285 EMERGENCY DEPT VISIT HI MDM: CPT | Mod: 25

## 2022-04-09 PROCEDURE — 85025 COMPLETE CBC W/AUTO DIFF WBC: CPT | Performed by: EMERGENCY MEDICINE

## 2022-04-09 PROCEDURE — 80048 BASIC METABOLIC PNL TOTAL CA: CPT | Performed by: EMERGENCY MEDICINE

## 2022-04-09 PROCEDURE — 93005 ELECTROCARDIOGRAM TRACING: CPT

## 2022-04-10 VITALS
DIASTOLIC BLOOD PRESSURE: 79 MMHG | OXYGEN SATURATION: 97 % | RESPIRATION RATE: 20 BRPM | SYSTOLIC BLOOD PRESSURE: 131 MMHG | HEART RATE: 82 BPM | TEMPERATURE: 97.5 F

## 2022-04-10 LAB
ATRIAL RATE - MUSE: 72 BPM
DIASTOLIC BLOOD PRESSURE - MUSE: NORMAL MMHG
INTERPRETATION ECG - MUSE: NORMAL
P AXIS - MUSE: 64 DEGREES
PR INTERVAL - MUSE: 162 MS
QRS DURATION - MUSE: 90 MS
QT - MUSE: 408 MS
QTC - MUSE: 446 MS
R AXIS - MUSE: 54 DEGREES
SYSTOLIC BLOOD PRESSURE - MUSE: NORMAL MMHG
T AXIS - MUSE: 45 DEGREES
VENTRICULAR RATE- MUSE: 72 BPM

## 2022-04-10 NOTE — ED PROVIDER NOTES
History     Chief Complaint:    Chest Pain      HPI   Adore Roland is a 41 year old female with history of costochondritis who presents for evaluation of chest pain.  Approximately 3 hours prior to presentation, the patient had onset of squeezing central sternal pain while at rest.  The pain was mild, although waxed and waned it is been constant since.  She denies difficulty breathing, shortness of breath, or pleuritic discomfort.  She denies back or abdominal pain associated with this.  She endorses no oral contraceptive or other hormone use.  She notes she has had costochondritis in the past but that felt different.    Review of Systems  CV: Positive as above  All other systems reviewed negative    Allergies:  No Known Allergies      Medications:    No OCPs  Levothyroxine      Past Medical History:    Past Medical History:   Diagnosis Date     GERD (gastroesophageal reflux disease)      Hashimoto's thyroiditis          Past Surgical History:    Past Surgical History:   Procedure Laterality Date     Lipoma excision         Family History:    Family History   Problem Relation Age of Onset     Diabetes Maternal Grandfather      Other - See Comments Maternal Grandfather         heart troubles     Diabetes Paternal Aunt        Social History:  Presents with SO    Physical Exam     Patient Vitals for the past 24 hrs:   BP Temp Pulse Resp SpO2   04/09/22 2230 121/77 -- 76 -- 98 %   04/09/22 2123 128/83 97.5  F (36.4  C) 80 16 99 %       Physical Exam  Constitutional: Alert, attentive  HENT:    Nose: Nose normal.    Mouth/Throat: Oropharynx is clear, mucous membranes are moist   Eyes: EOM are normal.   CV: regular rate and rhythm; no murmurs, rubs or gallups  Chest: Effort normal and breath sounds normal.   GI:  There is no tenderness. No distension. Normal bowel sounds  MSK: Normal range of motion.   Neurological: Alert, attentive  Skin: Skin is warm and dry.      Emergency Department Course   ECG:  Normal sinus  rhythm, rate 72, no ST/T abnormality.  No significant change from November 18, 2021    Imaging:  XR Chest 2 Views   Preliminary Result   IMPRESSION: No evidence of active cardiopulmonary disease.           Laboratory:  Labs Ordered and Resulted from Time of ED Arrival to Time of ED Departure   CBC WITH PLATELETS AND DIFFERENTIAL - Abnormal       Result Value    WBC Count 9.3      RBC Count 4.15      Hemoglobin 11.6 (*)     Hematocrit 36.3      MCV 88      MCH 28.0      MCHC 32.0      RDW 13.1      Platelet Count 297      % Neutrophils 55      % Lymphocytes 34      % Monocytes 8      % Eosinophils 2      % Basophils 1      % Immature Granulocytes 0      NRBCs per 100 WBC 0      Absolute Neutrophils 5.1      Absolute Lymphocytes 3.1      Absolute Monocytes 0.7      Absolute Eosinophils 0.2      Absolute Basophils 0.1      Absolute Immature Granulocytes 0.0      Absolute NRBCs 0.0     BASIC METABOLIC PANEL - Normal    Sodium 139      Potassium 3.6      Chloride 107      Carbon Dioxide (CO2) 29      Anion Gap 3      Urea Nitrogen 10      Creatinine 0.78      Calcium 8.8      Glucose 96      GFR Estimate >90     TROPONIN I - Normal    Troponin I High Sensitivity <3         Emergency Department Course:    Reviewed:    I reviewed nursing notes, vitals and past history    Assessments:   I obtained history and examined the patient as noted above.    I rechecked the patient and explained findings.       Disposition:  The patient was discharged to home.    Impression & Plan        HEART Score  Background  Calculates the overall risk of adverse event in patient's presenting with chest pain.  Based on 5 criteria (each assigned 0-2 points) including suspiciousness of history, EKG, age, risk factors and troponin.    Data  41 year old female  has Acute pain of left knee on their problem list.   reports that she has never smoked. She has never used smokeless tobacco.  family history includes Diabetes in her maternal grandfather and  paternal aunt; Other - See Comments in her maternal grandfather.  No results found for: TROPI  Criteria   0-2 points for each of 5 items (maximum of 10 points):  Score 0- History slightly suspicious for coronary syndrome  Score 0- EKG Normal  Score 0- Age <45 years old  Score 0- No risk factors for atherosclerotic disease  Score 0- Within normal limits for troponin levels  Interpretation  Risk of adverse outcome  Heart Score: 0  Total Score 0-3- Adverse Outcome Risk 2.5% - Supports early discharge with appropriate follow-up          Medical Decision Making:  This is a 41-year-old female presents for evaluation of chest pain.  Pain is atypical in nature.  Given negative EKG and ultralow troponin, MI is essentially ruled out.  She is PERC negative, such ruling out PE.  Chest x-ray shows no widened mediastinum, pneumothorax, pneumonia.  She has no abdominal or other concerns to explain her symptoms.  Symptoms have improved since shortly after arrival.  Plan primary care follow-up in 3 to 5 days return precautions worse pain, shortness of breath, or any other concerns.    Covid-19  Adore Roland was evaluated during a global COVID-19 pandemic, which necessitated consideration that the patient might be at risk for infection with the SARS-CoV-2 virus that causes COVID-19.   Applicable protocols for evaluation were followed during the patient's care.       Diagnosis:    ICD-10-CM    1. Chest pain, unspecified type  R07.9           Nate Albrecht MD  04/10/22 0023

## 2022-09-10 ENCOUNTER — HEALTH MAINTENANCE LETTER (OUTPATIENT)
Age: 42
End: 2022-09-10

## 2023-01-22 ENCOUNTER — HEALTH MAINTENANCE LETTER (OUTPATIENT)
Age: 43
End: 2023-01-22

## 2024-02-18 ENCOUNTER — HEALTH MAINTENANCE LETTER (OUTPATIENT)
Age: 44
End: 2024-02-18

## 2024-03-14 ENCOUNTER — HOSPITAL ENCOUNTER (EMERGENCY)
Facility: CLINIC | Age: 44
Discharge: HOME | End: 2024-03-14
Attending: STUDENT IN AN ORGANIZED HEALTH CARE EDUCATION/TRAINING PROGRAM | Admitting: STUDENT IN AN ORGANIZED HEALTH CARE EDUCATION/TRAINING PROGRAM
Payer: COMMERCIAL

## 2024-03-14 VITALS
SYSTOLIC BLOOD PRESSURE: 121 MMHG | BODY MASS INDEX: 32.72 KG/M2 | TEMPERATURE: 98.2 F | HEART RATE: 77 BPM | WEIGHT: 221.56 LBS | OXYGEN SATURATION: 98 % | RESPIRATION RATE: 16 BRPM | DIASTOLIC BLOOD PRESSURE: 75 MMHG

## 2024-03-14 DIAGNOSIS — D64.9 ANEMIA, UNSPECIFIED TYPE: ICD-10-CM

## 2024-03-14 DIAGNOSIS — M25.512 ACUTE PAIN OF LEFT SHOULDER: ICD-10-CM

## 2024-03-14 DIAGNOSIS — R07.9 CHEST PAIN, UNSPECIFIED TYPE: Primary | ICD-10-CM

## 2024-03-14 PROBLEM — R76.8 ANTI-TPO ANTIBODIES PRESENT: Status: ACTIVE | Noted: 2018-07-16

## 2024-03-14 PROBLEM — K64.8 HEMORRHOIDS, INTERNAL: Status: ACTIVE | Noted: 2021-09-20

## 2024-03-14 PROBLEM — D12.6 ADENOMATOUS POLYP OF COLON: Status: ACTIVE | Noted: 2021-04-08

## 2024-03-14 PROBLEM — K21.9 GERD (GASTROESOPHAGEAL REFLUX DISEASE): Status: ACTIVE | Noted: 2019-06-18

## 2024-03-14 PROBLEM — R87.610 ASCUS OF CERVIX WITH NEGATIVE HIGH RISK HPV: Status: ACTIVE | Noted: 2024-02-21

## 2024-03-14 PROBLEM — E04.1 THYROID NODULE: Status: ACTIVE | Noted: 2018-07-16

## 2024-03-14 PROBLEM — G44.209 TENSION HEADACHE: Status: ACTIVE | Noted: 2021-09-20

## 2024-03-14 LAB
ALBUMIN SERPL BCG-MCNC: 4.2 G/DL (ref 3.5–5.2)
ALP SERPL-CCNC: 87 U/L (ref 40–150)
ALT SERPL W P-5'-P-CCNC: 19 U/L (ref 0–50)
ANION GAP SERPL CALCULATED.3IONS-SCNC: 10 MMOL/L (ref 7–15)
AST SERPL W P-5'-P-CCNC: 17 U/L (ref 0–45)
BASOPHILS # BLD AUTO: 0 10E3/UL (ref 0–0.2)
BASOPHILS NFR BLD AUTO: 1 %
BILIRUB SERPL-MCNC: 0.3 MG/DL
BUN SERPL-MCNC: 9.1 MG/DL (ref 6–20)
CALCIUM SERPL-MCNC: 9 MG/DL (ref 8.6–10)
CHLORIDE SERPL-SCNC: 103 MMOL/L (ref 98–107)
CREAT SERPL-MCNC: 0.95 MG/DL (ref 0.51–0.95)
DEPRECATED HCO3 PLAS-SCNC: 24 MMOL/L (ref 22–29)
EGFRCR SERPLBLD CKD-EPI 2021: 77 ML/MIN/1.73M2
EOSINOPHIL # BLD AUTO: 0.1 10E3/UL (ref 0–0.7)
EOSINOPHIL NFR BLD AUTO: 2 %
ERYTHROCYTE [DISTWIDTH] IN BLOOD BY AUTOMATED COUNT: 13.9 % (ref 10–15)
GLUCOSE SERPL-MCNC: 104 MG/DL (ref 70–99)
HCT VFR BLD AUTO: 34.8 % (ref 35–47)
HGB BLD-MCNC: 11.2 G/DL (ref 11.7–15.7)
HOLD SPECIMEN: NORMAL
HOLD SPECIMEN: NORMAL
IMM GRANULOCYTES # BLD: 0 10E3/UL
IMM GRANULOCYTES NFR BLD: 0 %
LIPASE SERPL-CCNC: 23 U/L (ref 13–60)
LYMPHOCYTES # BLD AUTO: 1.7 10E3/UL (ref 0.8–5.3)
LYMPHOCYTES NFR BLD AUTO: 27 %
MCH RBC QN AUTO: 27.3 PG (ref 26.5–33)
MCHC RBC AUTO-ENTMCNC: 32.2 G/DL (ref 31.5–36.5)
MCV RBC AUTO: 85 FL (ref 78–100)
MONOCYTES # BLD AUTO: 0.6 10E3/UL (ref 0–1.3)
MONOCYTES NFR BLD AUTO: 10 %
NEUTROPHILS # BLD AUTO: 3.9 10E3/UL (ref 1.6–8.3)
NEUTROPHILS NFR BLD AUTO: 60 %
NRBC # BLD AUTO: 0 10E3/UL
NRBC BLD AUTO-RTO: 0 /100
PLATELET # BLD AUTO: 280 10E3/UL (ref 150–450)
POTASSIUM SERPL-SCNC: 3.5 MMOL/L (ref 3.4–5.3)
PROT SERPL-MCNC: 8.1 G/DL (ref 6.4–8.3)
RBC # BLD AUTO: 4.1 10E6/UL (ref 3.8–5.2)
SODIUM SERPL-SCNC: 137 MMOL/L (ref 135–145)
TROPONIN T SERPL HS-MCNC: <6 NG/L
WBC # BLD AUTO: 6.4 10E3/UL (ref 4–11)

## 2024-03-14 PROCEDURE — 85004 AUTOMATED DIFF WBC COUNT: CPT | Performed by: STUDENT IN AN ORGANIZED HEALTH CARE EDUCATION/TRAINING PROGRAM

## 2024-03-14 PROCEDURE — 83690 ASSAY OF LIPASE: CPT | Performed by: STUDENT IN AN ORGANIZED HEALTH CARE EDUCATION/TRAINING PROGRAM

## 2024-03-14 PROCEDURE — 93005 ELECTROCARDIOGRAM TRACING: CPT

## 2024-03-14 PROCEDURE — 99284 EMERGENCY DEPT VISIT MOD MDM: CPT

## 2024-03-14 PROCEDURE — 80053 COMPREHEN METABOLIC PANEL: CPT | Performed by: STUDENT IN AN ORGANIZED HEALTH CARE EDUCATION/TRAINING PROGRAM

## 2024-03-14 PROCEDURE — 36415 COLL VENOUS BLD VENIPUNCTURE: CPT | Performed by: STUDENT IN AN ORGANIZED HEALTH CARE EDUCATION/TRAINING PROGRAM

## 2024-03-14 PROCEDURE — 84484 ASSAY OF TROPONIN QUANT: CPT | Performed by: STUDENT IN AN ORGANIZED HEALTH CARE EDUCATION/TRAINING PROGRAM

## 2024-03-14 RX ORDER — LEVOTHYROXINE SODIUM 50 UG/1
50 TABLET ORAL
COMMUNITY
Start: 2024-02-13

## 2024-03-14 ASSESSMENT — COLUMBIA-SUICIDE SEVERITY RATING SCALE - C-SSRS
6. HAVE YOU EVER DONE ANYTHING, STARTED TO DO ANYTHING, OR PREPARED TO DO ANYTHING TO END YOUR LIFE?: NO
2. HAVE YOU ACTUALLY HAD ANY THOUGHTS OF KILLING YOURSELF IN THE PAST MONTH?: NO
1. IN THE PAST MONTH, HAVE YOU WISHED YOU WERE DEAD OR WISHED YOU COULD GO TO SLEEP AND NOT WAKE UP?: NO

## 2024-03-14 ASSESSMENT — ACTIVITIES OF DAILY LIVING (ADL): ADLS_ACUITY_SCORE: 33
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
## 2024-03-14 NOTE — ED TRIAGE NOTES
Pt. Presents to ED from  for a chest pain work up. Pt. Reports yesterday she only had GERD like symptoms and fatigue. Today developed chest pain that radiates between her shoulder blades and down her L arm along with SOB. A & O x 4, independent. AVSS on RA. Denies cardiopulmonary diagnoses. HX of GERD and hypothyroidism.

## 2024-03-15 LAB
ATRIAL RATE - MUSE: 74 BPM
DIASTOLIC BLOOD PRESSURE - MUSE: NORMAL MMHG
INTERPRETATION ECG - MUSE: NORMAL
P AXIS - MUSE: 50 DEGREES
PR INTERVAL - MUSE: 182 MS
QRS DURATION - MUSE: 90 MS
QT - MUSE: 386 MS
QTC - MUSE: 428 MS
R AXIS - MUSE: 44 DEGREES
SYSTOLIC BLOOD PRESSURE - MUSE: NORMAL MMHG
T AXIS - MUSE: 37 DEGREES
VENTRICULAR RATE- MUSE: 74 BPM

## 2025-01-17 ENCOUNTER — HOSPITAL ENCOUNTER (EMERGENCY)
Facility: CLINIC | Age: 45
Discharge: HOME | End: 2025-01-17
Attending: EMERGENCY MEDICINE | Admitting: EMERGENCY MEDICINE
Payer: COMMERCIAL

## 2025-01-17 VITALS
DIASTOLIC BLOOD PRESSURE: 79 MMHG | TEMPERATURE: 97.9 F | BODY MASS INDEX: 31.85 KG/M2 | WEIGHT: 222.44 LBS | HEIGHT: 70 IN | RESPIRATION RATE: 18 BRPM | SYSTOLIC BLOOD PRESSURE: 115 MMHG | OXYGEN SATURATION: 97 % | HEART RATE: 97 BPM

## 2025-01-17 DIAGNOSIS — M54.2 NECK PAIN: ICD-10-CM

## 2025-01-17 DIAGNOSIS — J10.1 INFLUENZA A: ICD-10-CM

## 2025-01-17 LAB
FLUAV RNA SPEC QL NAA+PROBE: POSITIVE
FLUBV RNA RESP QL NAA+PROBE: NEGATIVE
RSV RNA SPEC NAA+PROBE: NEGATIVE
S PYO DNA THROAT QL NAA+PROBE: NOT DETECTED
SARS-COV-2 RNA RESP QL NAA+PROBE: NEGATIVE

## 2025-01-17 PROCEDURE — 250N000013 HC RX MED GY IP 250 OP 250 PS 637: Performed by: EMERGENCY MEDICINE

## 2025-01-17 PROCEDURE — 87651 STREP A DNA AMP PROBE: CPT | Performed by: EMERGENCY MEDICINE

## 2025-01-17 PROCEDURE — 87637 SARSCOV2&INF A&B&RSV AMP PRB: CPT | Performed by: EMERGENCY MEDICINE

## 2025-01-17 PROCEDURE — 99283 EMERGENCY DEPT VISIT LOW MDM: CPT | Performed by: EMERGENCY MEDICINE

## 2025-01-17 RX ORDER — ACETAMINOPHEN 500 MG
1000 TABLET ORAL ONCE
Status: COMPLETED | OUTPATIENT
Start: 2025-01-17 | End: 2025-01-17

## 2025-01-17 RX ADMIN — ACETAMINOPHEN 1000 MG: 500 TABLET, FILM COATED ORAL at 09:53

## 2025-01-17 ASSESSMENT — COLUMBIA-SUICIDE SEVERITY RATING SCALE - C-SSRS
1. IN THE PAST MONTH, HAVE YOU WISHED YOU WERE DEAD OR WISHED YOU COULD GO TO SLEEP AND NOT WAKE UP?: NO
6. HAVE YOU EVER DONE ANYTHING, STARTED TO DO ANYTHING, OR PREPARED TO DO ANYTHING TO END YOUR LIFE?: NO
2. HAVE YOU ACTUALLY HAD ANY THOUGHTS OF KILLING YOURSELF IN THE PAST MONTH?: NO

## 2025-01-17 ASSESSMENT — ACTIVITIES OF DAILY LIVING (ADL)
ADLS_ACUITY_SCORE: 41
ADLS_ACUITY_SCORE: 41

## 2025-01-17 NOTE — ED TRIAGE NOTES
Couple weeks ago head neck stiffness. Seen in [place] at the time. Lab tests normal.  Went to [facility] after pain persisted and was told she likely had inflammation and a pinched nerve. Started steroid pack, started to feel better, last dose due today.   Yesterday neck pain started coming back, then developed low grade temp, headache, sore throat as well as a slight cough. Nothing taken for symptoms today.

## 2025-01-17 NOTE — ED PROVIDER NOTES
"  Emergency Department Note      History of Present Illness     Chief Complaint   Pharyngitis and Neck Pain      HPI   Adore Roland is a 44 year old female with history of Hashimoto's thyroiditis who presents to the ED for pharyngitis and neck pain. The patient reports that she developed sudden onset neck pain and stiffness about 2 weeks ago. She states that her pain is very diffuse through the neck, shoulders, and posterior head. Her pain is worsened by laterally movement of her head. She was seen in another hospital for her symptoms where labs were taken. They came back negative and the patient was discharged with a painkiller and a muscle relaxer. The patient was seen a couple days later at [facility] because her neck pain continued to persist. The patient was treated for inflammation and a pinched nerve. She was sent home with a steroid pack which significantly reduced her neck pain. Then yesterday, the patient's neck pain returned along with a headache, sore throat, mild cough, and low grade fever which brings her into the ED. Denies vomiting, diarrhea, numbness tingling, weakness in the extremities, dysphagia, chest pain, shortness of breath, and leg swelling.     Independent Historian   None    Review of External Notes   I reviewed the ED note from 1/3/25 for musculoskeletal neck pain.     Past Medical History     Medical History and Problem List   GERD  Adenomatous colon polyp  Thyroid nodule   Hashimoto's thyroiditis     Medications   Levothyroxine   Omeprazole       Physical Exam     Patient Vitals for the past 24 hrs:   BP Temp Pulse Resp SpO2 Height Weight   01/17/25 1137 115/79 -- 97 18 97 % -- --   01/17/25 0922 (!) 136/96 97.9  F (36.6  C) 114 -- 97 % 1.778 m (5' 10\") 100.9 kg (222 lb 7.1 oz)     Physical Exam  Vitals and nursing note reviewed.   Constitutional:       General: She is not in acute distress.     Appearance: She is ill-appearing. She is not toxic-appearing.   HENT:      Head: Normocephalic " and atraumatic.      Right Ear: External ear normal.      Left Ear: External ear normal.      Nose: Nose normal.      Mouth/Throat:      Mouth: Mucous membranes are moist.   Eyes:      Extraocular Movements: Extraocular movements intact.      Conjunctiva/sclera: Conjunctivae normal.   Cardiovascular:      Rate and Rhythm: Normal rate and regular rhythm.      Heart sounds: No murmur heard.  Pulmonary:      Effort: Pulmonary effort is normal. No respiratory distress.      Breath sounds: Normal breath sounds. No wheezing, rhonchi or rales.   Abdominal:      General: Abdomen is flat. Bowel sounds are normal. There is no distension.      Palpations: Abdomen is soft.      Tenderness: There is no abdominal tenderness. There is no guarding or rebound.   Musculoskeletal:         General: No deformity or signs of injury.      Cervical back: Normal range of motion and neck supple. No rigidity.   Skin:     General: Skin is warm and dry.      Findings: No rash.   Neurological:      Mental Status: She is alert and oriented to person, place, and time.      Motor: No weakness.   Psychiatric:         Behavior: Behavior normal.           Diagnostics     Lab Results   Labs Ordered and Resulted from Time of ED Arrival to Time of ED Departure   INFLUENZA A/B, RSV AND SARS-COV2 PCR - Abnormal       Result Value    Influenza A PCR Positive (*)     Influenza B PCR Negative      RSV PCR Negative      SARS CoV2 PCR Negative     GROUP A STREPTOCOCCUS PCR THROAT SWAB - Normal    Group A strep by PCR Not Detected         Imaging   No orders to display       Independent Interpretation   None    ED Course      Medications Administered   Medications   acetaminophen (TYLENOL) tablet 1,000 mg (1,000 mg Oral $Given 1/17/25 0912)       Procedures   Procedures     Discussion of Management   None    ED Course   ED Course as of 01/17/25 1220   Fri Jan 17, 2025   0903 I obtained history and performed physical exam as noted above.    1126 I updated the  patient and prepared her for discharge.       Additional Documentation  None    Medical Decision Making / Diagnosis     CMS Diagnoses: None    MIPS       None    MDM   Adore Roland is a 44 year old female who presents with ongoing neck pain for the past 2 weeks, now with fever, and flulike symptoms.  I suspect that the neck pain and the flulike symptoms are unrelated.  I suspect that she likely has influenza or similar viral infection.  She does not have any meningeal signs and I have a very low suspicion for meningitis at this point.  She has no signs or symptoms of oropharyngeal abscess either.  I suspect the neck pain is due to musculoskeletal etiology such as radiculopathy or muscle strain/spasm.  Viral swabs were sent and positive for influenza.  We discussed supportive care and return precautions.  Recommended primary care follow-up if her neck pain is not improving over the next week.    Disposition   The patient was discharged.     Diagnosis     ICD-10-CM    1. Influenza A  J10.1       2. Neck pain  M54.2            Discharge Medications   Discharge Medication List as of 1/17/2025 11:40 AM            Scribe Disclosure:  I, Evonne Rivers, am serving as a scribe at 9:31 AM on 1/17/2025 to document services personally performed by Bravo Hinds MD based on my observations and the provider's statements to me.        Bravo Hinds MD  01/17/25 7825

## 2025-04-03 ENCOUNTER — HOSPITAL ENCOUNTER (EMERGENCY)
Facility: CLINIC | Age: 45
Discharge: HOME | End: 2025-04-03
Attending: EMERGENCY MEDICINE
Payer: COMMERCIAL

## 2025-04-03 ENCOUNTER — APPOINTMENT (OUTPATIENT)
Dept: CT IMAGING | Facility: CLINIC | Age: 45
End: 2025-04-03
Attending: EMERGENCY MEDICINE
Payer: COMMERCIAL

## 2025-04-03 VITALS
RESPIRATION RATE: 18 BRPM | BODY MASS INDEX: 32 KG/M2 | SYSTOLIC BLOOD PRESSURE: 128 MMHG | TEMPERATURE: 97.7 F | DIASTOLIC BLOOD PRESSURE: 85 MMHG | OXYGEN SATURATION: 99 % | WEIGHT: 223.55 LBS | HEIGHT: 70 IN | HEART RATE: 85 BPM

## 2025-04-03 DIAGNOSIS — G44.209 TENSION HEADACHE: ICD-10-CM

## 2025-04-03 LAB
ALBUMIN UR-MCNC: NEGATIVE MG/DL
ANION GAP SERPL CALCULATED.3IONS-SCNC: 10 MMOL/L (ref 7–15)
APPEARANCE UR: ABNORMAL
BACTERIA #/AREA URNS HPF: ABNORMAL /HPF
BASOPHILS # BLD AUTO: 0.1 10E3/UL (ref 0–0.2)
BASOPHILS NFR BLD AUTO: 1 %
BILIRUB UR QL STRIP: NEGATIVE
BUN SERPL-MCNC: 10.6 MG/DL (ref 6–20)
CALCIUM SERPL-MCNC: 9 MG/DL (ref 8.8–10.4)
CHLORIDE SERPL-SCNC: 105 MMOL/L (ref 98–107)
COLOR UR AUTO: ABNORMAL
CREAT SERPL-MCNC: 0.85 MG/DL (ref 0.51–0.95)
EGFRCR SERPLBLD CKD-EPI 2021: 87 ML/MIN/1.73M2
EOSINOPHIL # BLD AUTO: 0.2 10E3/UL (ref 0–0.7)
EOSINOPHIL NFR BLD AUTO: 2 %
ERYTHROCYTE [DISTWIDTH] IN BLOOD BY AUTOMATED COUNT: 13.8 % (ref 10–15)
FLUAV RNA SPEC QL NAA+PROBE: NEGATIVE
FLUBV RNA RESP QL NAA+PROBE: NEGATIVE
GLUCOSE SERPL-MCNC: 106 MG/DL (ref 70–99)
GLUCOSE UR STRIP-MCNC: NEGATIVE MG/DL
HCG SERPL QL: NEGATIVE
HCO3 SERPL-SCNC: 22 MMOL/L (ref 22–29)
HCT VFR BLD AUTO: 34.4 % (ref 35–47)
HGB BLD-MCNC: 10.8 G/DL (ref 11.7–15.7)
HGB UR QL STRIP: ABNORMAL
HOLD SPECIMEN: NORMAL
HOLD SPECIMEN: NORMAL
IMM GRANULOCYTES # BLD: 0 10E3/UL
IMM GRANULOCYTES NFR BLD: 0 %
KETONES UR STRIP-MCNC: NEGATIVE MG/DL
LEUKOCYTE ESTERASE UR QL STRIP: ABNORMAL
LYMPHOCYTES # BLD AUTO: 3.2 10E3/UL (ref 0.8–5.3)
LYMPHOCYTES NFR BLD AUTO: 30 %
MCH RBC QN AUTO: 26.2 PG (ref 26.5–33)
MCHC RBC AUTO-ENTMCNC: 31.4 G/DL (ref 31.5–36.5)
MCV RBC AUTO: 84 FL (ref 78–100)
MONOCYTES # BLD AUTO: 0.8 10E3/UL (ref 0–1.3)
MONOCYTES NFR BLD AUTO: 7 %
MUCOUS THREADS #/AREA URNS LPF: PRESENT /LPF
NEUTROPHILS # BLD AUTO: 6.4 10E3/UL (ref 1.6–8.3)
NEUTROPHILS NFR BLD AUTO: 60 %
NITRATE UR QL: NEGATIVE
NRBC # BLD AUTO: 0 10E3/UL
NRBC BLD AUTO-RTO: 0 /100
PH UR STRIP: 6.5 [PH] (ref 5–7)
PLATELET # BLD AUTO: 296 10E3/UL (ref 150–450)
POTASSIUM SERPL-SCNC: 4 MMOL/L (ref 3.4–5.3)
RBC # BLD AUTO: 4.12 10E6/UL (ref 3.8–5.2)
RBC URINE: 2 /HPF
RSV RNA SPEC NAA+PROBE: NEGATIVE
SARS-COV-2 RNA RESP QL NAA+PROBE: NEGATIVE
SODIUM SERPL-SCNC: 137 MMOL/L (ref 135–145)
SP GR UR STRIP: 1.03 (ref 1–1.03)
SQUAMOUS EPITHELIAL: 14 /HPF
UROBILINOGEN UR STRIP-MCNC: NORMAL MG/DL
WBC # BLD AUTO: 10.7 10E3/UL (ref 4–11)
WBC URINE: 7 /HPF

## 2025-04-03 PROCEDURE — 250N000009 HC RX 250: Performed by: EMERGENCY MEDICINE

## 2025-04-03 PROCEDURE — 87086 URINE CULTURE/COLONY COUNT: CPT | Performed by: EMERGENCY MEDICINE

## 2025-04-03 PROCEDURE — 85025 COMPLETE CBC W/AUTO DIFF WBC: CPT | Performed by: EMERGENCY MEDICINE

## 2025-04-03 PROCEDURE — 96361 HYDRATE IV INFUSION ADD-ON: CPT

## 2025-04-03 PROCEDURE — 70496 CT ANGIOGRAPHY HEAD: CPT

## 2025-04-03 PROCEDURE — 96374 THER/PROPH/DIAG INJ IV PUSH: CPT | Mod: 59

## 2025-04-03 PROCEDURE — 70450 CT HEAD/BRAIN W/O DYE: CPT

## 2025-04-03 PROCEDURE — 80048 BASIC METABOLIC PNL TOTAL CA: CPT | Performed by: EMERGENCY MEDICINE

## 2025-04-03 PROCEDURE — 96375 TX/PRO/DX INJ NEW DRUG ADDON: CPT

## 2025-04-03 PROCEDURE — 258N000003 HC RX IP 258 OP 636: Performed by: EMERGENCY MEDICINE

## 2025-04-03 PROCEDURE — 250N000011 HC RX IP 250 OP 636: Performed by: EMERGENCY MEDICINE

## 2025-04-03 PROCEDURE — 81001 URINALYSIS AUTO W/SCOPE: CPT | Performed by: EMERGENCY MEDICINE

## 2025-04-03 PROCEDURE — 87637 SARSCOV2&INF A&B&RSV AMP PRB: CPT | Performed by: EMERGENCY MEDICINE

## 2025-04-03 PROCEDURE — 36415 COLL VENOUS BLD VENIPUNCTURE: CPT | Performed by: EMERGENCY MEDICINE

## 2025-04-03 PROCEDURE — 80051 ELECTROLYTE PANEL: CPT | Performed by: EMERGENCY MEDICINE

## 2025-04-03 PROCEDURE — 84703 CHORIONIC GONADOTROPIN ASSAY: CPT | Performed by: EMERGENCY MEDICINE

## 2025-04-03 PROCEDURE — 99285 EMERGENCY DEPT VISIT HI MDM: CPT | Mod: 25

## 2025-04-03 RX ORDER — CYCLOBENZAPRINE HCL 10 MG
10 TABLET ORAL 3 TIMES DAILY PRN
Qty: 20 TABLET | Refills: 0 | Status: SHIPPED | OUTPATIENT
Start: 2025-04-03 | End: 2025-04-10

## 2025-04-03 RX ORDER — DIPHENHYDRAMINE HYDROCHLORIDE 50 MG/ML
25 INJECTION, SOLUTION INTRAMUSCULAR; INTRAVENOUS ONCE
Status: COMPLETED | OUTPATIENT
Start: 2025-04-03 | End: 2025-04-03

## 2025-04-03 RX ORDER — IOPAMIDOL 755 MG/ML
500 INJECTION, SOLUTION INTRAVASCULAR ONCE
Status: COMPLETED | OUTPATIENT
Start: 2025-04-03 | End: 2025-04-03

## 2025-04-03 RX ADMIN — SODIUM CHLORIDE 80 ML: 9 INJECTION, SOLUTION INTRAVENOUS at 22:35

## 2025-04-03 RX ADMIN — SODIUM CHLORIDE 1000 ML: 9 INJECTION, SOLUTION INTRAVENOUS at 21:53

## 2025-04-03 RX ADMIN — PROCHLORPERAZINE EDISYLATE 5 MG: 5 INJECTION INTRAMUSCULAR; INTRAVENOUS at 21:51

## 2025-04-03 RX ADMIN — IOPAMIDOL 67 ML: 755 INJECTION, SOLUTION INTRAVENOUS at 22:35

## 2025-04-03 RX ADMIN — DIPHENHYDRAMINE HYDROCHLORIDE 25 MG: 50 INJECTION, SOLUTION INTRAMUSCULAR; INTRAVENOUS at 21:51

## 2025-04-03 ASSESSMENT — ACTIVITIES OF DAILY LIVING (ADL)
ADLS_ACUITY_SCORE: 41
ADLS_ACUITY_SCORE: 41

## 2025-04-03 ASSESSMENT — COLUMBIA-SUICIDE SEVERITY RATING SCALE - C-SSRS
1. IN THE PAST MONTH, HAVE YOU WISHED YOU WERE DEAD OR WISHED YOU COULD GO TO SLEEP AND NOT WAKE UP?: NO
2. HAVE YOU ACTUALLY HAD ANY THOUGHTS OF KILLING YOURSELF IN THE PAST MONTH?: NO
6. HAVE YOU EVER DONE ANYTHING, STARTED TO DO ANYTHING, OR PREPARED TO DO ANYTHING TO END YOUR LIFE?: NO

## 2025-04-04 NOTE — ED PROVIDER NOTES
"  Emergency Department Note      History of Present Illness     Chief Complaint   Headache      HPI   Adore Roland is a 44 year old female who presents to the ED for evaluation of a headache over the last 4 to 5 days.  The headache is waxed and waned and was not sudden onset.  She has a history of tension headaches that are typically bandlike across the front and sometimes in the shoulders and neck.  She feels that this headache is different and is more along the sides bilaterally.  The headache does tend to move around and currently is more felt over the vertex.  No trauma.  No focal neurologic changes such as numbness or weakness, word finding difficulties, loss of coordination, or gait difficulties.  No vision changes.  No recent changes to diet or medications or activities or exercises.  No known provoking factors for this particular headache.  No personal or family history of any cerebrovascular disease or aneurysms.  Her last menses was about 2 weeks ago.  She does not typically have headaches associated with her menses.  No recent fever, cough, or nasal congestion.  No neck stiffness or confusion.      Review of External Notes   Prior notes reviewed from September 12, 2021 when the patient had a noncontrast head CT that was negative.    Past Medical History     Medical History and Problem List   Past Medical History:   Diagnosis Date    GERD (gastroesophageal reflux disease)     Hashimoto's thyroiditis        Medications   levothyroxine (SYNTHROID/LEVOTHROID) 50 MCG tablet  omeprazole (PRILOSEC) 20 MG DR capsule        Surgical History   Past Surgical History:   Procedure Laterality Date    Lipoma excision         Physical Exam     Patient Vitals for the past 24 hrs:   BP Temp Temp src Pulse Resp SpO2 Height Weight   04/03/25 2103 128/85 97.7  F (36.5  C) Temporal 85 18 99 % 1.778 m (5' 10\") 101.4 kg (223 lb 8.7 oz)     Physical Exam  Constitutional:       General: She is not in acute distress.     " Appearance: Normal appearance. She is not diaphoretic.   HENT:      Head: Atraumatic.      Right Ear: Tympanic membrane, ear canal and external ear normal.      Left Ear: Tympanic membrane, ear canal and external ear normal.      Mouth/Throat:      Mouth: Mucous membranes are moist.      Pharynx: No oropharyngeal exudate or posterior oropharyngeal erythema.   Eyes:      General: No scleral icterus.     Extraocular Movements: Extraocular movements intact.      Conjunctiva/sclera: Conjunctivae normal.      Pupils: Pupils are equal, round, and reactive to light.   Cardiovascular:      Rate and Rhythm: Normal rate and regular rhythm.      Heart sounds: Normal heart sounds.   Pulmonary:      Effort: No respiratory distress.      Breath sounds: Normal breath sounds.   Abdominal:      General: Abdomen is flat. There is no distension.   Musculoskeletal:      Cervical back: Neck supple.   Skin:     General: Skin is warm.      Capillary Refill: Capillary refill takes less than 2 seconds.      Findings: No rash.   Neurological:      General: No focal deficit present.      Mental Status: She is alert and oriented to person, place, and time.      Comments: Speech is fluent.  Gait is normal.  No facial asymmetry.  Sensation light touch intact and symmetric in the face, arms, and legs.   strength 5 out of 5 bilateral hands.  No limb dysmetria.   Psychiatric:         Mood and Affect: Mood normal.         Behavior: Behavior normal.       ***    Diagnostics     Lab Results   Labs Ordered and Resulted from Time of ED Arrival to Time of ED Departure - No data to display    Imaging   CT Head w/o Contrast    (Results Pending)   CTA Head Neck w Contrast    (Results Pending)       EKG   ECG taken at ***, ECG read at ***  ***   *** as compared to prior, dated ***/***/***.  Rate *** bpm. [place] interval *** ms. QRS duration *** ms. QT/QTc ***/*** ms. P-R-T axes *** *** ***.    Independent Interpretation  "  {IndependentReview:427417::\"None\"}    ED Course      Medications Administered   Medications   sodium chloride 0.9% BOLUS 1,000 mL (has no administration in time range)   prochlorperazine (COMPAZINE) injection 5 mg (has no administration in time range)   diphenhydrAMINE (BENADRYL) injection 25 mg (has no administration in time range)       Procedures   Procedures     Discussion of Management   {Consults/Care Discussions:794182::\"None\"}    ED Course        Additional Documentation  {EPPAAdditionalPhrase:748232::\"None\"}    Medical Decision Making / Diagnosis     CMS Diagnoses: {Sepsis/Septic Shock/Stemi/Stroke:578605::\"None\"}    MIPS       {EPPA MIPS:280508::\"None\"}    DEBBIE Roland is a 44 year old female ***    Disposition   {EPPAFV Dispo:221870}    Diagnosis   No diagnosis found.     Discharge Medications   New Prescriptions    No medications on file         {Provider or scribe signature:041991}   " Administered   Medications   sodium chloride 0.9% BOLUS 1,000 mL (0 mLs Intravenous Stopped 4/3/25 2302)   prochlorperazine (COMPAZINE) injection 5 mg (5 mg Intravenous $Given 4/3/25 2151)   diphenhydrAMINE (BENADRYL) injection 25 mg (25 mg Intravenous $Given 4/3/25 2151)   iopamidol (ISOVUE-370) solution 500 mL (67 mLs Intravenous $Given 4/3/25 2235)   sodium chloride 0.9 % bag for CT scan flush (80 mLs Intravenous $Given 4/3/25 2235)       Medical Decision Making / Diagnosis     DEBBIE Roland is a 44 year old female who presents to the ED for evaluation of a headache.  New to slightly different than her typical tension headaches.  She was concerned about a brain aneurysm.  CTA fortunately negative for aneurysm, dissection, bleed, CVA, or other acute pathology.  Lab workup overall looks good.  She feels improved here in the ED.  This likely is still a tension headache given her description of it.  She will Flexeril for use at home.  I discussed that if her headaches persist she should pursue formal neurology referral through her primary care clinic.    Disposition   The patient was discharged.     Diagnosis     ICD-10-CM    1. Tension headache  G44.209            Discharge Medications   Discharge Medication List as of 4/3/2025 11:33 PM        START taking these medications    Details   cyclobenzaprine (FLEXERIL) 10 MG tablet Take 1 tablet (10 mg) by mouth 3 times daily as needed., Disp-20 tablet, R-0, E-Prescribe                     Brendan Hamilton MD  04/04/25 1202

## 2025-04-04 NOTE — ED TRIAGE NOTES
Headaches and pressure behind eyes for last 4 days. Not typical of her migraines in the past.

## 2025-04-05 ENCOUNTER — HEALTH MAINTENANCE LETTER (OUTPATIENT)
Age: 45
End: 2025-04-05

## 2025-04-05 LAB — BACTERIA UR CULT: NORMAL
